# Patient Record
Sex: MALE | Race: WHITE
[De-identification: names, ages, dates, MRNs, and addresses within clinical notes are randomized per-mention and may not be internally consistent; named-entity substitution may affect disease eponyms.]

---

## 2017-12-20 NOTE — NUR
MS RN NOTES 



PATIENT IN BED RESTING NO SOB OR ACUTE DISTRESS NOTED. ARRIVED FROM ER. PATIENT ORIENTED TO 
ROOM AND FLOOR. CALL LIGHT WITHIN REACH. BED IN LOW LOCKED POSITION.  PATIENTS SKIN ASSESSED 
WOUND CARE PERFORMED. WILL CONTINUE TO MONITOR CLOSELY.

## 2017-12-20 NOTE — NUR
MS RN NOTES 



PATIENT IN BED RESTING NO SOB OR ACUTE DISTRESS NOTED. ALL DUE MEDICATIONS ADMINISTERED. 
PERIPHERAL IV INTACT PATENT. ALL NEEDS MET WILL ENDORSE TO PM SHIFT NIGEL.

## 2017-12-20 NOTE — NUR
KRISTY FROM [facility] FOR WOUND CHECK AND DEBRIDEMENT. PER PT PROCEDURE 
SCHEDULED TOMORROW. NAD NOTED. PT AAO X3, RR EVEN AND UNLABORED. VSS. PENDING 
MD KENT.

## 2017-12-20 NOTE — NUR
MS RN NOTE



RECEIVED PATIENT AWAKE AND ALERT IN BED. DENIES ANY PAIN OR DISCOMFORT AT THIS TIME. PICC 
LINE INTACT TO RUBY. DEBRIDEMENT IN AM TO ABHINAV AND KANDACE. BED LOCKED AND IN LOWEST POSITION. 
SIDE RAILS UP, CALL LIGHT WITHIN REACH. WILL CONTINUE TO MONITOR.

## 2017-12-21 NOTE — NUR
Patient currently resides at [facility] [phone]. He is alert and 
oriented, has good family support. Plan to dc back to [facility] when discharge.

-------------------------------------------------------------------------------

Addendum: 12/21/17 at 2113 by MOJGAN RANDLE RN

-------------------------------------------------------------------------------

Amended: Links added.

## 2017-12-21 NOTE — NUR
MS RN NOTES 



PATIENT REPORTS HAVING SOME EGGS FOR THE MORNING OR NOTIFIED ALSO DR. SUSAN OLGUIN NOTIFIED STATES 
SURGERY IS CANCELLED PATIENT WILL HAVE HIS FOOT DEBRIDEMENT AT BEDSIDE. NOTED AND CARRIED 
OUT.

## 2017-12-21 NOTE — NUR
MS RN NOTES 



PATIENT IN BED RESTING NO SOB OR ACUTE DISTRESS NOTED. PATIENT ALERT, ORIENTED X4 PICCLINE 
ON RIGHT UPPER ARM INTACT PATENT. BED IN LOW LOCKED POSITION CALL LIGHT WITHIN REACH. 
PATIENT NPO VERBALIZED FOR DEBRIDEMENT   UNDERSTANDING, WILL CONTINUE TO MONITOR.

## 2017-12-21 NOTE — NUR
WOUND CARE CONSULT   WOUND CARE RECEIVED CONSULT FOR PATIENT.  PATIENT WITH CURRENT XIOMARA 
AT 20.  SURGICAL TEAM FOLLOWING.  WOUND CARE WILL DEFER TREATMENT PLANS TO SURGICAL TEAM AT 
THIS TIME.

## 2017-12-21 NOTE — NUR
RN NOTES



BG 89 MG/DL, NO INSULIN COVERAGE, ONLY ACCUCHECK. PER DR. KENROY BOLES D5 1/2 NS AT 75 
CC/HR, EXPLAINED TO PATIENT RATIONALE FOR THE ORDER.

## 2017-12-21 NOTE — NUR
MS RN NOTES 



PATIENT IN BED RESTING NO SOB OR ACUTE DISTRESS NOTED. ALL DUE MEDICATIONS ADMINISTERED. ALL 
NEEDS MET. PATIENT HAD RIGHT FOOT DEBRIDEMENT AND TOLERATED WELL. PICC LINE INTACT PATENT. 
ENDORSED CARE TO PM SHIFT.

## 2017-12-21 NOTE — NUR
RN NOTES



PATIENT IN BED, ALERT AND ORIENTED X3, CALM, NO SOB, ON ROOM AIR, SPO2 99%, DENIES ANY PAIN 
AT THIS TIME, RIGHT UPPER ARM PICC LINE IS SECURED WITH DRESSING, RIGHT FOOT AND RIGHT TOE 
SECURED WITH DRESSING. PATIENT REFUSING IV FLUIDS, PER PATIENT MAKES HIS BLOOD SUGAR HIGHER. 
PATIENT IS ON D5 1/2 NS AND IS DIABETIC. KEPT SAFE AND COMFORTABLE, CALL LIGHT WITHIN REACH.

## 2017-12-22 NOTE — NUR
WOUND CARE CONSULT    WOUND CARE RECEIVED WOUND CARE CONSULT FOR PATIENT.  WOUND CARE WILL 
DEFER ALL TREATMENT PLANS TO SURGICAL TEAM WHO ARE FOLLOWING THIS PATIENT.  PATIENT WITH 
CURRENT XIOMARA AT 20.

## 2017-12-22 NOTE — NUR
M/S RN - Discharge

Patient alert and oriented throughout the shift, denies pain, not in any form of distress, 
stable for discharge to [facility]. Reviewed discharge instructions with ART Huston 
and he verbalized full understanding of all teachings including continuation of his Vanco 
and Merrem IV for his right foot and left finger wound. Patient to be discharged with RUBY 
PICC line, single lumen port flushing well, no signs of infiltration. Patient made aware 
that he also need to come back by 12/28/17 for scheduled surgery with Dr. Mi for left 
finger amputation. Patient refused photo to be taken on his skin breakdown. All belongings 
with the patient and he denies any missing items. CM arranged for ambulance  at 
18:45. Spoke with  and ETA will be 19:30 instead. Patient made aware of 
the delayed in pick-up time.

## 2017-12-22 NOTE — NUR
M/S RN - Discharge

Patient discharged to SNF [facility] in stable condition, endorsed to ambulance crew 
accordingly, discharge papers given.

## 2017-12-22 NOTE — NUR
RN NOTES



PATIENT IN BED, RESTING COMFORTABLY, NO SOB, NO DISTRESS, NO COMPLAIN OF PAIN, SLEPT 
INTERMITTENTLY FOR 5 HOURS, NO ADVERSE CHANGE OF CONDITION DURING SHIFT, ALL DUE MEDICATIONS 
GIVEN, CALL LIGHT WITHIN REACH.

## 2017-12-27 NOTE — NUR
PT CAME IN WITH C/O NON-HEALING LEFT MIDDLE FINGER CELLULITIS. SEEN BY MD FOR 
EVAL. VSS. PICC LINE SINGLE LUMEN ON RUBY. SAFETY AND COMFORT MEAUSRES PROVIDED. 
WILL MONITOR.

## 2017-12-27 NOTE — NUR
MS/RN  CLOSING NOTE



PATIENT IN BED AWAKE. ALERT AND ORIENTED X3. BREATHING REGULAR AND UNLABORED. IN NO APPARENT 
DISTRESS. DENIES SOB. DENIES PAIN. BED LOW AND LOCKED. SIDE RAIL X2 IN UPRIGHT POSITION. 
CALL LIGHT WITHIN REACH. WILL ENDORSED TO NIGHT SHIFT.

## 2017-12-27 NOTE — NUR
RN OPENING NOTES



RECEIVED REPORT FROM TIMOTHY RN. FOUND Pt AWAKE RESTING IN BED. NO S/S OF ACUTE DISTRESS OR 
SOB NOTED. NO C/O PAIN AT THIS TIME. IV ACCESS ON RUBY PICC LINE, IVF NS @75ML/HR, INFUSING 
WELL. Pt WILL BE NPO AT [facility] FOR PROCEDURE IN THE AM TOMORROW. SAFETY MEASURES IN PLACE. BED 
LOW, LOCKED, HOB ELEVATED, SIDE RAILS UP, CALL LIGHT AND BEDSIDE TABLE WITHIN REACH. WILL 
CONTINUE TO MONITOR Pt THROUGHOUT THE NIGHT FOR SAFETY.

## 2017-12-27 NOTE — NUR
MS/RN



RECEIVED THE PATIENT ON THE [facility]. ALERT AND ORIENTED X3. RESPIRATION REGULAR AND 
UNLABORED. DENIES SOB, PAIN AT THIS TIME. IN NO APPARENT DISTRESS. RIGHT UPPER ARM PICC LINE 
PATENT AND VANCO INFUSING WELL. NO ASE NOTED. BED LOW AND LOCKED. CALL LIGHT WITHIN REACH. 
REMINDED TO PRESS THE CALL LIGHT FOR ASSISTANCE. WILL CONTINUE TO MONITOR.

## 2017-12-28 NOTE — NUR
RN NOTES:





  PATIENT RETURNED FROM SURGERY. NO SIGNS OF DISTRESS. PATIENT AOX4. CAPILLARY REFILL LESS 
THAN 3 SECONDS ON LEFT HAND, DENIES PAIN, NO SWELLING, ABLE TO MOVE FINGERS, FINGERS WARM 
AND PINK. DRESSING INTACT. ORDERS TO RESUME PREVIOUS DIET AND ORDERS. AND TO KEEP DRESSING 
INTACT AND DRY. PATIENT REFUSED PLAVIX AND ASPIRIN FOR TODAY, THEY WERE HELD INITIALLY DUE 
TO THE STATUS OF PATIENT BEING NPO, BENEFITS AND RISKS EXPLAINED TO PATIENT



PATIENT EATING TOLERATING DIET WELL.



PATIENT ARRIVED ON UNIT AND VS ASSESSED EVERY Q 15 MINTUES:



125/54, SPO2 98, HR 58, TEMP 97.8



113/49 SPO2 96, TEMP 97.8, HR 59



110/52  SPO2 98, TEMP 97.6, HR 56



112/54 SPO2 95, TEMP 98.0, HR 58 WITH NONLABORED BREATHING NOTED 





CELLPHONE GLASSES WALLET RETURNED TO PATIENT AT BEDSIDE

## 2017-12-28 NOTE — NUR
RN NOTES:



  PATIENT AOX4. RESTING IN BED. NONLABORED BREATHING NOTED ON ROOM AIR. NO SIGNS OF DISTRESS 
NOTED. PATIENT DENIES PAIN AT THE MOMENT. PICC LINE PATENT AND INTACT. BED IN LOWEST LOCKED 
POSITION. CALL LIGHT WITHIN REACH. WILL CONTINUE TO MONITOR

## 2017-12-28 NOTE — NUR
RN NOTES:





  PATIENT AOX4. DISCHARGED TO [facility] PER DR MAKENNA RUIZ'S ORDERS. NONLABORED 
BREATHING ON ROOM AIR. VS WNL. ALL VALUABLES GIVEN TO PATIENT. PATIENT EDUCATED ON EXIST 
CARE VERBALIZES UNDERSTANDING. PATIENT AFEBRILE. PATIENT STATES THAT HE FEELS "LESS 
SENSATION IN THUMB AND INDEX FINGERS AFTER THE SURGERY AND THE MEDICATIONS ADMINISTERED IN 
SURGERY. CAPILLARY REFILL LESS THAN 3 SECONDS, HAND AND FINGERS WARM AND PINK. PATIENT ABLE 
TO MOVE FINGERS. NO SWELLING, RADIAL PULSE PALPABLE. HE STATES THAT IT IS GETTING BETTER. DR RUIZ INFORMED,



GAVE REPORT TO COLETTE NURSING SUPERVISOR AT [facility], DR DAVIS TO REMOVE HAND DRESSING 
IN A WEEK, INSTRUCTIONS EXPLAINED TO PATIENT AND STAFF, VERBALIZE UNDERSTANDING.  PICC LINE 
PATENT AND INTACT. PATIENT REFUSING LOWER EXTREMITY WOUND DRESSING, SKIN PICTURES, AND FLU 
VACCINE. BENEFITS AND RISKS EXPLAINED MULITPLE TIMES. PATIENT LEFT VIA AMBULANCE

## 2017-12-28 NOTE — NUR
RN CLOSING NOTES



NO SIGNIFICANT CHANGES IN Pt'S CONDITION. Pt REMAINS STABLE AT THIS TIME. NO S/S OF ACUTE 
DISTRESS OR SOB NOTED DURING THE NIGHT. ALL NEEDS MET AND ATTENDED TO. SAFETY MEASURES IN 
PLACE. WILL ENDORSE TO DAYSHIFT RN FOR Pt's NIGEL.

## 2018-02-22 ENCOUNTER — HOSPITAL ENCOUNTER (OUTPATIENT)
Dept: HOSPITAL 54 - WOU | Age: 68
Discharge: HOME HEALTH | End: 2018-02-22
Attending: SURGERY
Payer: MEDICARE

## 2018-02-22 DIAGNOSIS — L97.512: ICD-10-CM

## 2018-02-22 DIAGNOSIS — Z79.899: ICD-10-CM

## 2018-02-22 DIAGNOSIS — I25.10: ICD-10-CM

## 2018-02-22 DIAGNOSIS — L60.3: ICD-10-CM

## 2018-02-22 DIAGNOSIS — E78.5: ICD-10-CM

## 2018-02-22 DIAGNOSIS — T81.31XA: Primary | ICD-10-CM

## 2018-02-22 DIAGNOSIS — Z89.422: ICD-10-CM

## 2018-02-22 DIAGNOSIS — Z72.0: ICD-10-CM

## 2018-02-22 DIAGNOSIS — E11.621: ICD-10-CM

## 2018-02-22 DIAGNOSIS — Z98.61: ICD-10-CM

## 2018-02-22 DIAGNOSIS — I10: ICD-10-CM

## 2018-02-22 DIAGNOSIS — Z79.84: ICD-10-CM

## 2018-02-22 DIAGNOSIS — E11.40: ICD-10-CM

## 2018-02-22 PROCEDURE — A6402 STERILE GAUZE <= 16 SQ IN: HCPCS

## 2018-03-01 ENCOUNTER — HOSPITAL ENCOUNTER (OUTPATIENT)
Dept: HOSPITAL 54 - WOU | Age: 68
Discharge: HOME | End: 2018-03-01
Attending: SURGERY
Payer: MEDICARE

## 2018-03-01 DIAGNOSIS — E11.40: ICD-10-CM

## 2018-03-01 DIAGNOSIS — Z09: Primary | ICD-10-CM

## 2018-03-01 DIAGNOSIS — T81.31XD: ICD-10-CM

## 2018-03-01 DIAGNOSIS — E11.621: ICD-10-CM

## 2018-03-01 DIAGNOSIS — E78.5: ICD-10-CM

## 2018-03-01 DIAGNOSIS — L97.512: ICD-10-CM

## 2018-03-01 DIAGNOSIS — Z89.022: ICD-10-CM

## 2018-03-01 DIAGNOSIS — Z79.899: ICD-10-CM

## 2018-03-01 DIAGNOSIS — Z98.62: ICD-10-CM

## 2018-03-01 DIAGNOSIS — L84: ICD-10-CM

## 2018-03-01 DIAGNOSIS — I25.10: ICD-10-CM

## 2018-03-01 DIAGNOSIS — I48.91: ICD-10-CM

## 2018-03-01 DIAGNOSIS — Z72.0: ICD-10-CM

## 2018-03-01 DIAGNOSIS — Z79.84: ICD-10-CM

## 2018-03-01 PROCEDURE — A6402 STERILE GAUZE <= 16 SQ IN: HCPCS

## 2018-03-01 PROCEDURE — G0463 HOSPITAL OUTPT CLINIC VISIT: HCPCS

## 2018-03-01 PROCEDURE — 11042 DBRDMT SUBQ TIS 1ST 20SQCM/<: CPT

## 2018-03-08 ENCOUNTER — HOSPITAL ENCOUNTER (OUTPATIENT)
Dept: HOSPITAL 54 - WOU | Age: 68
Discharge: HOME HEALTH | End: 2018-03-08
Attending: SURGERY
Payer: MEDICARE

## 2018-03-08 DIAGNOSIS — Z72.0: ICD-10-CM

## 2018-03-08 DIAGNOSIS — I25.10: ICD-10-CM

## 2018-03-08 DIAGNOSIS — E11.621: ICD-10-CM

## 2018-03-08 DIAGNOSIS — T87.81: Primary | ICD-10-CM

## 2018-03-08 DIAGNOSIS — Z98.62: ICD-10-CM

## 2018-03-08 DIAGNOSIS — L97.512: ICD-10-CM

## 2018-03-08 DIAGNOSIS — E11.40: ICD-10-CM
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
## 2018-03-08 DIAGNOSIS — Z79.84: ICD-10-CM

## 2018-03-08 DIAGNOSIS — I48.91: ICD-10-CM

## 2018-03-08 DIAGNOSIS — E78.5: ICD-10-CM

## 2018-03-08 DIAGNOSIS — S51.812D: ICD-10-CM

## 2018-03-08 PROCEDURE — 11042 DBRDMT SUBQ TIS 1ST 20SQCM/<: CPT

## 2018-03-08 PROCEDURE — A6402 STERILE GAUZE <= 16 SQ IN: HCPCS

## 2018-03-08 PROCEDURE — G0463 HOSPITAL OUTPT CLINIC VISIT: HCPCS

## 2018-03-29 ENCOUNTER — HOSPITAL ENCOUNTER (OUTPATIENT)
Dept: HOSPITAL 54 - WOU | Age: 68
Discharge: HOME HEALTH | End: 2018-03-29
Attending: PODIATRIST
Payer: MEDICARE

## 2018-03-29 DIAGNOSIS — E78.5: ICD-10-CM

## 2018-03-29 DIAGNOSIS — Z89.022: ICD-10-CM

## 2018-03-29 DIAGNOSIS — I25.10: ICD-10-CM

## 2018-03-29 DIAGNOSIS — Z72.0: ICD-10-CM

## 2018-03-29 DIAGNOSIS — I48.91: ICD-10-CM

## 2018-03-29 DIAGNOSIS — L97.512: ICD-10-CM

## 2018-03-29 DIAGNOSIS — Z79.84: ICD-10-CM

## 2018-03-29 DIAGNOSIS — Z09: ICD-10-CM

## 2018-03-29 DIAGNOSIS — E11.40: ICD-10-CM

## 2018-03-29 DIAGNOSIS — Z79.899: ICD-10-CM

## 2018-03-29 DIAGNOSIS — E11.621: Primary | ICD-10-CM

## 2018-03-29 PROCEDURE — A6402 STERILE GAUZE <= 16 SQ IN: HCPCS

## 2018-03-29 PROCEDURE — G0463 HOSPITAL OUTPT CLINIC VISIT: HCPCS

## 2018-04-19 ENCOUNTER — HOSPITAL ENCOUNTER (OUTPATIENT)
Dept: HOSPITAL 54 - WOU | Age: 68
Discharge: HOME | End: 2018-04-19
Attending: SURGERY
Payer: MEDICARE

## 2018-04-19 DIAGNOSIS — Z91.19: ICD-10-CM

## 2018-04-19 DIAGNOSIS — I10: ICD-10-CM

## 2018-04-19 DIAGNOSIS — E78.5: ICD-10-CM

## 2018-04-19 DIAGNOSIS — E11.40: ICD-10-CM

## 2018-04-19 DIAGNOSIS — T81.31XD: ICD-10-CM

## 2018-04-19 DIAGNOSIS — Z89.412: ICD-10-CM

## 2018-04-19 DIAGNOSIS — L97.512: ICD-10-CM

## 2018-04-19 DIAGNOSIS — I25.10: ICD-10-CM

## 2018-04-19 DIAGNOSIS — E11.621: Primary | ICD-10-CM

## 2018-04-19 DIAGNOSIS — Z89.022: ICD-10-CM

## 2018-04-19 PROCEDURE — G0463 HOSPITAL OUTPT CLINIC VISIT: HCPCS

## 2018-04-19 PROCEDURE — A6402 STERILE GAUZE <= 16 SQ IN: HCPCS

## 2018-04-26 ENCOUNTER — HOSPITAL ENCOUNTER (OUTPATIENT)
Dept: HOSPITAL 54 - WOU | Age: 68
Discharge: HOME | End: 2018-04-26
Attending: PODIATRIST
Payer: MEDICARE
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
## 2018-04-26 DIAGNOSIS — Z79.84: ICD-10-CM

## 2018-04-26 DIAGNOSIS — S81.811A: ICD-10-CM

## 2018-04-26 DIAGNOSIS — Z89.022: ICD-10-CM

## 2018-04-26 DIAGNOSIS — S91.311A: ICD-10-CM

## 2018-04-26 DIAGNOSIS — L97.512: ICD-10-CM

## 2018-04-26 DIAGNOSIS — E11.40: ICD-10-CM

## 2018-04-26 DIAGNOSIS — F17.200: ICD-10-CM

## 2018-04-26 DIAGNOSIS — Z89.412: ICD-10-CM

## 2018-04-26 DIAGNOSIS — I10: ICD-10-CM
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
## 2018-04-26 DIAGNOSIS — E11.621: Primary | ICD-10-CM

## 2018-04-26 PROCEDURE — A6402 STERILE GAUZE <= 16 SQ IN: HCPCS

## 2018-05-03 ENCOUNTER — HOSPITAL ENCOUNTER (OUTPATIENT)
Dept: HOSPITAL 54 - WOU | Age: 68
Discharge: HOME | End: 2018-05-03
Attending: PODIATRIST
Payer: MEDICARE

## 2018-05-03 DIAGNOSIS — E11.621: Primary | ICD-10-CM

## 2018-05-03 DIAGNOSIS — Z89.412: ICD-10-CM

## 2018-05-03 DIAGNOSIS — I10: ICD-10-CM

## 2018-05-03 DIAGNOSIS — F17.200: ICD-10-CM

## 2018-05-03 DIAGNOSIS — E11.40: ICD-10-CM

## 2018-05-03 DIAGNOSIS — E78.5: ICD-10-CM

## 2018-05-03 DIAGNOSIS — Z79.84: ICD-10-CM

## 2018-05-03 DIAGNOSIS — Z89.022: ICD-10-CM

## 2018-05-03 DIAGNOSIS — I25.10: ICD-10-CM

## 2018-05-03 DIAGNOSIS — L97.512: ICD-10-CM

## 2018-05-03 PROCEDURE — A6402 STERILE GAUZE <= 16 SQ IN: HCPCS

## 2018-05-17 ENCOUNTER — HOSPITAL ENCOUNTER (OUTPATIENT)
Dept: HOSPITAL 54 - WOU | Age: 68
Discharge: HOME | End: 2018-05-17
Attending: PODIATRIST
Payer: MEDICARE

## 2018-05-17 DIAGNOSIS — L97.512: ICD-10-CM

## 2018-05-17 DIAGNOSIS — E11.40: ICD-10-CM

## 2018-05-17 DIAGNOSIS — Z89.412: ICD-10-CM

## 2018-05-17 DIAGNOSIS — Z98.62: ICD-10-CM

## 2018-05-17 DIAGNOSIS — I25.10: ICD-10-CM

## 2018-05-17 DIAGNOSIS — L84: ICD-10-CM

## 2018-05-17 DIAGNOSIS — Z72.0: ICD-10-CM

## 2018-05-17 DIAGNOSIS — E11.621: Primary | ICD-10-CM

## 2018-05-17 DIAGNOSIS — Z89.022: ICD-10-CM

## 2018-05-17 DIAGNOSIS — Z79.84: ICD-10-CM

## 2018-05-17 PROCEDURE — A6402 STERILE GAUZE <= 16 SQ IN: HCPCS

## 2018-05-24 ENCOUNTER — HOSPITAL ENCOUNTER (OUTPATIENT)
Dept: HOSPITAL 54 - WOU | Age: 68
Discharge: HOME | End: 2018-05-24
Attending: PODIATRIST
Payer: MEDICARE

## 2018-05-24 DIAGNOSIS — Z89.022: ICD-10-CM

## 2018-05-24 DIAGNOSIS — E11.40: ICD-10-CM

## 2018-05-24 DIAGNOSIS — Z79.899: ICD-10-CM

## 2018-05-24 DIAGNOSIS — Z72.0: ICD-10-CM

## 2018-05-24 DIAGNOSIS — Z79.84: ICD-10-CM

## 2018-05-24 DIAGNOSIS — E11.621: Primary | ICD-10-CM

## 2018-05-24 DIAGNOSIS — L97.512: ICD-10-CM

## 2018-05-24 PROCEDURE — 11042 DBRDMT SUBQ TIS 1ST 20SQCM/<: CPT

## 2018-05-24 PROCEDURE — A6402 STERILE GAUZE <= 16 SQ IN: HCPCS

## 2018-05-24 PROCEDURE — A6207 CONTACT LAYER >16<= 48 SQ IN: HCPCS

## 2018-05-31 ENCOUNTER — HOSPITAL ENCOUNTER (OUTPATIENT)
Dept: HOSPITAL 54 - WOU | Age: 68
Discharge: HOME | End: 2018-05-31
Attending: PODIATRIST
Payer: MEDICARE

## 2018-05-31 DIAGNOSIS — Z72.0: ICD-10-CM

## 2018-05-31 DIAGNOSIS — E11.40: ICD-10-CM

## 2018-05-31 DIAGNOSIS — Z91.19: ICD-10-CM

## 2018-05-31 DIAGNOSIS — Z79.84: ICD-10-CM

## 2018-05-31 DIAGNOSIS — I25.10: ICD-10-CM

## 2018-05-31 DIAGNOSIS — L97.512: ICD-10-CM

## 2018-05-31 DIAGNOSIS — E11.621: Primary | ICD-10-CM

## 2018-05-31 DIAGNOSIS — Z98.61: ICD-10-CM

## 2018-05-31 DIAGNOSIS — E78.5: ICD-10-CM

## 2018-05-31 PROCEDURE — 11042 DBRDMT SUBQ TIS 1ST 20SQCM/<: CPT

## 2018-05-31 PROCEDURE — A6402 STERILE GAUZE <= 16 SQ IN: HCPCS

## 2018-06-04 ENCOUNTER — HOSPITAL ENCOUNTER (OUTPATIENT)
Dept: HOSPITAL 54 - WOU | Age: 68
Discharge: HOME HEALTH | End: 2018-06-04
Attending: PODIATRIST
Payer: MEDICARE

## 2018-06-04 DIAGNOSIS — Z98.62: ICD-10-CM

## 2018-06-04 DIAGNOSIS — E78.5: ICD-10-CM

## 2018-06-04 DIAGNOSIS — Z89.022: ICD-10-CM

## 2018-06-04 DIAGNOSIS — I25.10: ICD-10-CM

## 2018-06-04 DIAGNOSIS — E11.40: ICD-10-CM

## 2018-06-04 DIAGNOSIS — L97.512: ICD-10-CM

## 2018-06-04 DIAGNOSIS — Z79.84: ICD-10-CM

## 2018-06-04 DIAGNOSIS — Z79.899: ICD-10-CM

## 2018-06-04 DIAGNOSIS — I10: ICD-10-CM

## 2018-06-04 DIAGNOSIS — E11.621: Primary | ICD-10-CM

## 2018-06-04 PROCEDURE — A6402 STERILE GAUZE <= 16 SQ IN: HCPCS

## 2018-06-04 PROCEDURE — 11042 DBRDMT SUBQ TIS 1ST 20SQCM/<: CPT

## 2018-06-14 ENCOUNTER — HOSPITAL ENCOUNTER (OUTPATIENT)
Dept: HOSPITAL 54 - WOU | Age: 68
Discharge: HOME HEALTH | End: 2018-06-14
Attending: PODIATRIST
Payer: MEDICARE

## 2018-06-14 DIAGNOSIS — I10: ICD-10-CM

## 2018-06-14 DIAGNOSIS — E11.40: ICD-10-CM

## 2018-06-14 DIAGNOSIS — E78.5: ICD-10-CM

## 2018-06-14 DIAGNOSIS — Z98.61: ICD-10-CM

## 2018-06-14 DIAGNOSIS — Z72.0: ICD-10-CM

## 2018-06-14 DIAGNOSIS — Z79.84: ICD-10-CM

## 2018-06-14 DIAGNOSIS — E11.621: Primary | ICD-10-CM

## 2018-06-14 DIAGNOSIS — L97.512: ICD-10-CM

## 2018-06-14 DIAGNOSIS — I25.10: ICD-10-CM

## 2018-06-14 PROCEDURE — A6402 STERILE GAUZE <= 16 SQ IN: HCPCS

## 2018-06-28 ENCOUNTER — HOSPITAL ENCOUNTER (OUTPATIENT)
Dept: HOSPITAL 54 - WOU | Age: 68
Discharge: HOME HEALTH | End: 2018-06-28
Attending: PODIATRIST
Payer: MEDICARE

## 2018-06-28 DIAGNOSIS — I10: ICD-10-CM

## 2018-06-28 DIAGNOSIS — Z79.84: ICD-10-CM

## 2018-06-28 DIAGNOSIS — L97.512: ICD-10-CM

## 2018-06-28 DIAGNOSIS — Z98.62: ICD-10-CM

## 2018-06-28 DIAGNOSIS — Z72.0: ICD-10-CM

## 2018-06-28 DIAGNOSIS — I25.10: ICD-10-CM

## 2018-06-28 DIAGNOSIS — E11.621: Primary | ICD-10-CM

## 2018-06-28 DIAGNOSIS — Z89.022: ICD-10-CM

## 2018-06-28 DIAGNOSIS — E11.40: ICD-10-CM

## 2018-06-28 DIAGNOSIS — T81.31XD: ICD-10-CM

## 2018-06-28 DIAGNOSIS — E78.5: ICD-10-CM

## 2018-06-28 PROCEDURE — Z7610: HCPCS

## 2018-06-28 PROCEDURE — A6402 STERILE GAUZE <= 16 SQ IN: HCPCS

## 2018-07-05 ENCOUNTER — HOSPITAL ENCOUNTER (OUTPATIENT)
Dept: HOSPITAL 54 - WOU | Age: 68
Discharge: HOME HEALTH | End: 2018-07-05
Attending: PODIATRIST
Payer: MEDICARE

## 2018-07-05 DIAGNOSIS — Z72.0: ICD-10-CM

## 2018-07-05 DIAGNOSIS — I25.10: ICD-10-CM

## 2018-07-05 DIAGNOSIS — L97.512: ICD-10-CM

## 2018-07-05 DIAGNOSIS — E78.5: ICD-10-CM

## 2018-07-05 DIAGNOSIS — E11.621: Primary | ICD-10-CM

## 2018-07-05 DIAGNOSIS — I10: ICD-10-CM

## 2018-07-05 DIAGNOSIS — Z79.84: ICD-10-CM

## 2018-07-05 DIAGNOSIS — Z98.61: ICD-10-CM

## 2018-07-05 DIAGNOSIS — Z79.899: ICD-10-CM

## 2018-07-05 PROCEDURE — Z7610: HCPCS

## 2018-07-05 PROCEDURE — A6402 STERILE GAUZE <= 16 SQ IN: HCPCS

## 2018-08-16 ENCOUNTER — HOSPITAL ENCOUNTER (OUTPATIENT)
Dept: HOSPITAL 54 - WOU | Age: 68
Discharge: HOME HEALTH | End: 2018-08-16
Attending: PODIATRIST
Payer: MEDICARE

## 2018-08-16 DIAGNOSIS — L97.519: ICD-10-CM

## 2018-08-16 DIAGNOSIS — Z98.62: ICD-10-CM

## 2018-08-16 DIAGNOSIS — E11.40: ICD-10-CM

## 2018-08-16 DIAGNOSIS — L97.512: ICD-10-CM

## 2018-08-16 DIAGNOSIS — Z72.0: ICD-10-CM

## 2018-08-16 DIAGNOSIS — Z79.84: ICD-10-CM

## 2018-08-16 DIAGNOSIS — I10: ICD-10-CM

## 2018-08-16 DIAGNOSIS — E78.5: ICD-10-CM

## 2018-08-16 DIAGNOSIS — E11.621: Primary | ICD-10-CM

## 2018-08-16 DIAGNOSIS — L03.115: ICD-10-CM

## 2018-08-16 DIAGNOSIS — E11.65: ICD-10-CM

## 2018-08-16 DIAGNOSIS — Z79.899: ICD-10-CM

## 2018-08-16 PROCEDURE — 11042 DBRDMT SUBQ TIS 1ST 20SQCM/<: CPT

## 2018-08-16 PROCEDURE — Z7610: HCPCS

## 2018-08-16 PROCEDURE — A6402 STERILE GAUZE <= 16 SQ IN: HCPCS

## 2018-08-23 ENCOUNTER — HOSPITAL ENCOUNTER (OUTPATIENT)
Dept: HOSPITAL 54 - WOU | Age: 68
Discharge: HOME HEALTH | End: 2018-08-23
Attending: PODIATRIST
Payer: MEDICARE

## 2018-08-23 DIAGNOSIS — E11.40: ICD-10-CM

## 2018-08-23 DIAGNOSIS — Z72.0: ICD-10-CM

## 2018-08-23 DIAGNOSIS — L97.518: ICD-10-CM

## 2018-08-23 DIAGNOSIS — Z79.899: ICD-10-CM

## 2018-08-23 DIAGNOSIS — E11.621: Primary | ICD-10-CM

## 2018-08-23 DIAGNOSIS — I10: ICD-10-CM

## 2018-08-23 DIAGNOSIS — Z98.61: ICD-10-CM

## 2018-08-23 DIAGNOSIS — I48.91: ICD-10-CM

## 2018-08-23 DIAGNOSIS — I25.10: ICD-10-CM

## 2018-08-23 DIAGNOSIS — Z79.84: ICD-10-CM

## 2018-08-23 DIAGNOSIS — E78.5: ICD-10-CM

## 2018-08-23 DIAGNOSIS — Z89.412: ICD-10-CM

## 2018-08-23 DIAGNOSIS — L97.512: ICD-10-CM

## 2018-08-23 DIAGNOSIS — L60.3: ICD-10-CM

## 2018-08-23 PROCEDURE — Z7610: HCPCS

## 2018-08-23 PROCEDURE — A6402 STERILE GAUZE <= 16 SQ IN: HCPCS

## 2018-09-13 ENCOUNTER — HOSPITAL ENCOUNTER (OUTPATIENT)
Dept: HOSPITAL 54 - WOU | Age: 68
Discharge: HOME HEALTH | End: 2018-09-13
Attending: PODIATRIST
Payer: MEDICARE

## 2018-09-13 DIAGNOSIS — E78.5: ICD-10-CM

## 2018-09-13 DIAGNOSIS — L97.512: ICD-10-CM

## 2018-09-13 DIAGNOSIS — Z72.0: ICD-10-CM

## 2018-09-13 DIAGNOSIS — Z79.899: ICD-10-CM

## 2018-09-13 DIAGNOSIS — L97.518: ICD-10-CM

## 2018-09-13 DIAGNOSIS — E11.621: Primary | ICD-10-CM

## 2018-09-13 DIAGNOSIS — I10: ICD-10-CM

## 2018-09-13 DIAGNOSIS — E11.40: ICD-10-CM

## 2018-09-13 DIAGNOSIS — Z91.19: ICD-10-CM

## 2018-09-13 DIAGNOSIS — Z79.84: ICD-10-CM

## 2018-09-13 DIAGNOSIS — I25.10: ICD-10-CM

## 2018-09-13 DIAGNOSIS — Z98.61: ICD-10-CM

## 2018-09-13 DIAGNOSIS — L60.3: ICD-10-CM

## 2018-09-13 PROCEDURE — Z7610: HCPCS

## 2018-09-13 PROCEDURE — 11042 DBRDMT SUBQ TIS 1ST 20SQCM/<: CPT

## 2018-09-13 PROCEDURE — A6402 STERILE GAUZE <= 16 SQ IN: HCPCS

## 2018-09-20 ENCOUNTER — HOSPITAL ENCOUNTER (OUTPATIENT)
Dept: HOSPITAL 54 - WOU | Age: 68
Discharge: HOME HEALTH | End: 2018-09-20
Attending: PODIATRIST
Payer: MEDICARE

## 2018-09-20 DIAGNOSIS — I10: ICD-10-CM

## 2018-09-20 DIAGNOSIS — F17.200: ICD-10-CM

## 2018-09-20 DIAGNOSIS — Z79.899: ICD-10-CM

## 2018-09-20 DIAGNOSIS — L97.412: ICD-10-CM

## 2018-09-20 DIAGNOSIS — E78.5: ICD-10-CM

## 2018-09-20 DIAGNOSIS — Z98.61: ICD-10-CM

## 2018-09-20 DIAGNOSIS — L60.3: ICD-10-CM

## 2018-09-20 DIAGNOSIS — E11.621: Primary | ICD-10-CM

## 2018-09-20 DIAGNOSIS — L97.512: ICD-10-CM

## 2018-09-20 DIAGNOSIS — I25.10: ICD-10-CM

## 2018-09-20 DIAGNOSIS — E11.40: ICD-10-CM

## 2018-09-20 DIAGNOSIS — Z79.84: ICD-10-CM

## 2018-09-20 PROCEDURE — A6402 STERILE GAUZE <= 16 SQ IN: HCPCS

## 2018-09-20 PROCEDURE — Z7610: HCPCS

## 2018-09-20 PROCEDURE — 11042 DBRDMT SUBQ TIS 1ST 20SQCM/<: CPT

## 2018-10-18 ENCOUNTER — HOSPITAL ENCOUNTER (OUTPATIENT)
Dept: HOSPITAL 54 - WOU | Age: 68
Discharge: HOME HEALTH | End: 2018-10-18
Attending: SURGERY
Payer: MEDICARE

## 2018-10-18 DIAGNOSIS — Z98.61: ICD-10-CM

## 2018-10-18 DIAGNOSIS — E78.5: ICD-10-CM

## 2018-10-18 DIAGNOSIS — L60.3: ICD-10-CM

## 2018-10-18 DIAGNOSIS — Z79.84: ICD-10-CM

## 2018-10-18 DIAGNOSIS — F17.200: ICD-10-CM

## 2018-10-18 DIAGNOSIS — L97.512: ICD-10-CM

## 2018-10-18 DIAGNOSIS — E11.621: Primary | ICD-10-CM

## 2018-10-18 DIAGNOSIS — I25.10: ICD-10-CM

## 2018-10-18 DIAGNOSIS — Z79.899: ICD-10-CM

## 2018-10-18 PROCEDURE — A6402 STERILE GAUZE <= 16 SQ IN: HCPCS

## 2018-10-18 PROCEDURE — Z7610: HCPCS

## 2018-12-17 ENCOUNTER — HOSPITAL ENCOUNTER (OUTPATIENT)
Dept: HOSPITAL 54 - WOU | Age: 68
Discharge: HOME HEALTH | End: 2018-12-17
Attending: PODIATRIST
Payer: MEDICARE

## 2018-12-17 DIAGNOSIS — L60.3: ICD-10-CM

## 2018-12-17 DIAGNOSIS — E11.40: Primary | ICD-10-CM

## 2018-12-17 DIAGNOSIS — Z79.84: ICD-10-CM

## 2018-12-17 PROCEDURE — 29445 APPL RIGID TOT CNTC LEG CAST: CPT

## 2018-12-17 PROCEDURE — Z7610: HCPCS

## 2018-12-17 PROCEDURE — A6402 STERILE GAUZE <= 16 SQ IN: HCPCS

## 2018-12-24 ENCOUNTER — HOSPITAL ENCOUNTER (OUTPATIENT)
Dept: HOSPITAL 54 - WOU | Age: 68
Discharge: HOME HEALTH | End: 2018-12-24
Attending: PODIATRIST
Payer: MEDICARE

## 2018-12-24 DIAGNOSIS — I48.91: ICD-10-CM

## 2018-12-24 DIAGNOSIS — Z89.022: ICD-10-CM

## 2018-12-24 DIAGNOSIS — I10: ICD-10-CM

## 2018-12-24 DIAGNOSIS — F17.200: ICD-10-CM

## 2018-12-24 DIAGNOSIS — I25.10: ICD-10-CM

## 2018-12-24 DIAGNOSIS — Z79.84: ICD-10-CM

## 2018-12-24 DIAGNOSIS — L60.3: ICD-10-CM

## 2018-12-24 DIAGNOSIS — E11.40: Primary | ICD-10-CM

## 2018-12-24 PROCEDURE — Z7610: HCPCS

## 2018-12-24 PROCEDURE — G0463 HOSPITAL OUTPT CLINIC VISIT: HCPCS

## 2019-01-10 ENCOUNTER — HOSPITAL ENCOUNTER (OUTPATIENT)
Dept: HOSPITAL 54 - WOU | Age: 69
Discharge: HOME HEALTH | End: 2019-01-10
Attending: PODIATRIST
Payer: MEDICARE

## 2019-01-10 DIAGNOSIS — I25.10: ICD-10-CM

## 2019-01-10 DIAGNOSIS — F17.200: ICD-10-CM

## 2019-01-10 DIAGNOSIS — E11.621: Primary | ICD-10-CM

## 2019-01-10 DIAGNOSIS — L97.512: ICD-10-CM

## 2019-01-10 DIAGNOSIS — Z79.84: ICD-10-CM

## 2019-01-10 DIAGNOSIS — Z79.899: ICD-10-CM

## 2019-01-10 DIAGNOSIS — Z89.412: ICD-10-CM

## 2019-01-10 DIAGNOSIS — L60.3: ICD-10-CM

## 2019-01-10 DIAGNOSIS — E11.40: ICD-10-CM

## 2019-01-10 DIAGNOSIS — Z89.022: ICD-10-CM

## 2019-01-10 PROCEDURE — A6402 STERILE GAUZE <= 16 SQ IN: HCPCS

## 2019-01-10 PROCEDURE — 11042 DBRDMT SUBQ TIS 1ST 20SQCM/<: CPT

## 2019-01-14 ENCOUNTER — HOSPITAL ENCOUNTER (OUTPATIENT)
Dept: HOSPITAL 54 - WOU | Age: 69
Discharge: HOME HEALTH | End: 2019-01-14
Attending: PODIATRIST
Payer: MEDICARE

## 2019-01-14 DIAGNOSIS — E11.40: ICD-10-CM

## 2019-01-14 DIAGNOSIS — L97.518: ICD-10-CM

## 2019-01-14 DIAGNOSIS — Z79.84: ICD-10-CM

## 2019-01-14 DIAGNOSIS — L60.3: ICD-10-CM

## 2019-01-14 DIAGNOSIS — E11.621: Primary | ICD-10-CM

## 2019-01-14 PROCEDURE — A6402 STERILE GAUZE <= 16 SQ IN: HCPCS

## 2019-01-14 PROCEDURE — 29445 APPL RIGID TOT CNTC LEG CAST: CPT

## 2019-01-24 ENCOUNTER — HOSPITAL ENCOUNTER (OUTPATIENT)
Dept: HOSPITAL 54 - WOU | Age: 69
Discharge: HOME HEALTH | End: 2019-01-24
Attending: PODIATRIST
Payer: MEDICARE

## 2019-01-24 DIAGNOSIS — Z89.022: ICD-10-CM

## 2019-01-24 DIAGNOSIS — Z89.412: ICD-10-CM

## 2019-01-24 DIAGNOSIS — L60.3: ICD-10-CM

## 2019-01-24 DIAGNOSIS — I25.10: ICD-10-CM

## 2019-01-24 DIAGNOSIS — I48.91: ICD-10-CM

## 2019-01-24 DIAGNOSIS — I10: ICD-10-CM

## 2019-01-24 DIAGNOSIS — L84: ICD-10-CM

## 2019-01-24 DIAGNOSIS — Z79.899: ICD-10-CM

## 2019-01-24 DIAGNOSIS — E11.40: Primary | ICD-10-CM

## 2019-01-24 DIAGNOSIS — E78.5: ICD-10-CM

## 2019-01-24 DIAGNOSIS — Z86.31: ICD-10-CM

## 2019-01-24 DIAGNOSIS — Z79.84: ICD-10-CM

## 2019-01-24 PROCEDURE — 29445 APPL RIGID TOT CNTC LEG CAST: CPT

## 2019-01-24 PROCEDURE — A6402 STERILE GAUZE <= 16 SQ IN: HCPCS

## 2019-02-04 ENCOUNTER — HOSPITAL ENCOUNTER (OUTPATIENT)
Dept: HOSPITAL 54 - WOU | Age: 69
Discharge: HOME HEALTH | End: 2019-02-04
Attending: PODIATRIST
Payer: MEDICARE

## 2019-02-04 DIAGNOSIS — Z79.84: ICD-10-CM

## 2019-02-04 DIAGNOSIS — E11.40: Primary | ICD-10-CM

## 2019-02-04 DIAGNOSIS — L60.3: ICD-10-CM

## 2019-02-04 DIAGNOSIS — F17.200: ICD-10-CM

## 2019-02-04 DIAGNOSIS — I10: ICD-10-CM

## 2019-02-04 DIAGNOSIS — Z86.31: ICD-10-CM

## 2019-02-04 PROCEDURE — G0463 HOSPITAL OUTPT CLINIC VISIT: HCPCS

## 2019-03-07 ENCOUNTER — HOSPITAL ENCOUNTER (OUTPATIENT)
Dept: HOSPITAL 54 - WOU | Age: 69
Discharge: HOME HEALTH | End: 2019-03-07
Attending: PODIATRIST
Payer: MEDICARE

## 2019-03-07 DIAGNOSIS — Z89.022: ICD-10-CM

## 2019-03-07 DIAGNOSIS — Z79.84: ICD-10-CM

## 2019-03-07 DIAGNOSIS — E11.621: Primary | ICD-10-CM

## 2019-03-07 DIAGNOSIS — I25.10: ICD-10-CM

## 2019-03-07 DIAGNOSIS — E11.40: ICD-10-CM

## 2019-03-07 DIAGNOSIS — I10: ICD-10-CM

## 2019-03-07 DIAGNOSIS — F17.200: ICD-10-CM

## 2019-03-07 DIAGNOSIS — I48.91: ICD-10-CM

## 2019-03-07 DIAGNOSIS — E78.5: ICD-10-CM

## 2019-03-07 DIAGNOSIS — Z89.412: ICD-10-CM

## 2019-03-07 DIAGNOSIS — L60.3: ICD-10-CM

## 2019-03-07 DIAGNOSIS — Z79.899: ICD-10-CM

## 2019-03-07 DIAGNOSIS — L97.512: ICD-10-CM

## 2019-03-07 DIAGNOSIS — Z98.62: ICD-10-CM

## 2019-03-07 PROCEDURE — A6209 FOAM DRSG <=16 SQ IN W/O BDR: HCPCS

## 2019-03-07 PROCEDURE — A6402 STERILE GAUZE <= 16 SQ IN: HCPCS

## 2019-03-07 PROCEDURE — 11042 DBRDMT SUBQ TIS 1ST 20SQCM/<: CPT

## 2019-03-14 ENCOUNTER — HOSPITAL ENCOUNTER (OUTPATIENT)
Dept: HOSPITAL 54 - WOU | Age: 69
Discharge: HOME HEALTH | End: 2019-03-14
Attending: PODIATRIST
Payer: MEDICARE

## 2019-03-14 DIAGNOSIS — Z79.899: ICD-10-CM

## 2019-03-14 DIAGNOSIS — I48.91: ICD-10-CM

## 2019-03-14 DIAGNOSIS — L60.3: ICD-10-CM

## 2019-03-14 DIAGNOSIS — E11.40: ICD-10-CM

## 2019-03-14 DIAGNOSIS — E78.5: ICD-10-CM

## 2019-03-14 DIAGNOSIS — E11.621: Primary | ICD-10-CM

## 2019-03-14 DIAGNOSIS — Z79.84: ICD-10-CM

## 2019-03-14 DIAGNOSIS — I10: ICD-10-CM

## 2019-03-14 DIAGNOSIS — L97.512: ICD-10-CM

## 2019-03-14 PROCEDURE — A6402 STERILE GAUZE <= 16 SQ IN: HCPCS

## 2019-03-14 PROCEDURE — 11042 DBRDMT SUBQ TIS 1ST 20SQCM/<: CPT

## 2019-03-14 PROCEDURE — A6209 FOAM DRSG <=16 SQ IN W/O BDR: HCPCS

## 2019-03-25 ENCOUNTER — HOSPITAL ENCOUNTER (INPATIENT)
Dept: HOSPITAL 54 - WOUND3 | Age: 69
LOS: 3 days | Discharge: NURSING, REHAB OR LONG-TERM CARE | DRG: 299 | End: 2019-03-28
Attending: INTERNAL MEDICINE | Admitting: NURSE PRACTITIONER
Payer: MEDICARE

## 2019-03-25 ENCOUNTER — HOSPITAL ENCOUNTER (OUTPATIENT)
Dept: HOSPITAL 54 - WOU | Age: 69
Discharge: HOME HEALTH | End: 2019-03-25
Attending: PODIATRIST
Payer: MEDICARE

## 2019-03-25 VITALS — WEIGHT: 266 LBS | HEIGHT: 71 IN | BODY MASS INDEX: 37.24 KG/M2

## 2019-03-25 VITALS — SYSTOLIC BLOOD PRESSURE: 136 MMHG | DIASTOLIC BLOOD PRESSURE: 68 MMHG

## 2019-03-25 VITALS — SYSTOLIC BLOOD PRESSURE: 134 MMHG | DIASTOLIC BLOOD PRESSURE: 62 MMHG

## 2019-03-25 VITALS — SYSTOLIC BLOOD PRESSURE: 168 MMHG | DIASTOLIC BLOOD PRESSURE: 74 MMHG

## 2019-03-25 DIAGNOSIS — F17.200: ICD-10-CM

## 2019-03-25 DIAGNOSIS — E11.42: ICD-10-CM

## 2019-03-25 DIAGNOSIS — Z89.412: ICD-10-CM

## 2019-03-25 DIAGNOSIS — E11.621: ICD-10-CM

## 2019-03-25 DIAGNOSIS — I25.10: ICD-10-CM

## 2019-03-25 DIAGNOSIS — E11.65: ICD-10-CM

## 2019-03-25 DIAGNOSIS — Z79.84: ICD-10-CM

## 2019-03-25 DIAGNOSIS — L60.3: ICD-10-CM

## 2019-03-25 DIAGNOSIS — E11.22: ICD-10-CM

## 2019-03-25 DIAGNOSIS — I48.91: ICD-10-CM

## 2019-03-25 DIAGNOSIS — Z89.022: ICD-10-CM

## 2019-03-25 DIAGNOSIS — E11.40: ICD-10-CM

## 2019-03-25 DIAGNOSIS — N18.9: ICD-10-CM

## 2019-03-25 DIAGNOSIS — E11.51: Primary | ICD-10-CM

## 2019-03-25 DIAGNOSIS — L97.515: ICD-10-CM

## 2019-03-25 DIAGNOSIS — D68.59: ICD-10-CM

## 2019-03-25 DIAGNOSIS — I12.9: ICD-10-CM

## 2019-03-25 DIAGNOSIS — Z95.5: ICD-10-CM

## 2019-03-25 DIAGNOSIS — L03.115: ICD-10-CM

## 2019-03-25 DIAGNOSIS — N17.0: ICD-10-CM

## 2019-03-25 DIAGNOSIS — L98.499: ICD-10-CM

## 2019-03-25 DIAGNOSIS — E66.9: ICD-10-CM

## 2019-03-25 DIAGNOSIS — L97.519: ICD-10-CM

## 2019-03-25 DIAGNOSIS — E11.621: Primary | ICD-10-CM

## 2019-03-25 DIAGNOSIS — I10: ICD-10-CM

## 2019-03-25 DIAGNOSIS — E78.5: ICD-10-CM

## 2019-03-25 DIAGNOSIS — Z95.820: ICD-10-CM

## 2019-03-25 LAB
ALBUMIN SERPL BCP-MCNC: 3.6 G/DL (ref 3.4–5)
ALP SERPL-CCNC: 128 U/L (ref 46–116)
ALT SERPL W P-5'-P-CCNC: 48 U/L (ref 12–78)
AST SERPL W P-5'-P-CCNC: 27 U/L (ref 15–37)
BASOPHILS # BLD AUTO: 0.1 /CMM (ref 0–0.2)
BASOPHILS NFR BLD AUTO: 0.8 % (ref 0–2)
BILIRUB DIRECT SERPL-MCNC: 0.1 MG/DL (ref 0–0.2)
BILIRUB SERPL-MCNC: 0.6 MG/DL (ref 0.2–1)
BUN SERPL-MCNC: 65 MG/DL (ref 7–18)
CALCIUM SERPL-MCNC: 9.3 MG/DL (ref 8.5–10.1)
CHLORIDE SERPL-SCNC: 100 MMOL/L (ref 98–107)
CHOLEST SERPL-MCNC: 131 MG/DL (ref ?–200)
CO2 SERPL-SCNC: 22 MMOL/L (ref 21–32)
CREAT SERPL-MCNC: 2.9 MG/DL (ref 0.6–1.3)
EOSINOPHIL NFR BLD AUTO: 1 % (ref 0–6)
GLUCOSE SERPL-MCNC: 464 MG/DL (ref 74–106)
HCT VFR BLD AUTO: 33 % (ref 39–51)
HDLC SERPL-MCNC: 29 MG/DL (ref 40–60)
HGB BLD-MCNC: 11.5 G/DL (ref 13.5–17.5)
LDLC SERPL DIRECT ASSAY-MCNC: 56 MG/DL (ref 0–99)
LYMPHOCYTES NFR BLD AUTO: 1.5 /CMM (ref 0.8–4.8)
LYMPHOCYTES NFR BLD AUTO: 22.9 % (ref 20–44)
MCHC RBC AUTO-ENTMCNC: 34 G/DL (ref 31–36)
MCV RBC AUTO: 86 FL (ref 80–96)
MONOCYTES NFR BLD AUTO: 0.6 /CMM (ref 0.1–1.3)
MONOCYTES NFR BLD AUTO: 9.9 % (ref 2–12)
NEUTROPHILS # BLD AUTO: 4.2 /CMM (ref 1.8–8.9)
NEUTROPHILS NFR BLD AUTO: 65.4 % (ref 43–81)
PLATELET # BLD AUTO: 166 /CMM (ref 150–450)
POTASSIUM SERPL-SCNC: 5.2 MMOL/L (ref 3.5–5.1)
PROT SERPL-MCNC: 7.7 G/DL (ref 6.4–8.2)
RBC # BLD AUTO: 3.91 MIL/UL (ref 4.5–6)
SODIUM SERPL-SCNC: 135 MMOL/L (ref 136–145)
TRIGL SERPL-MCNC: 567 MG/DL (ref 30–150)
TSH SERPL DL<=0.005 MIU/L-ACNC: 3.07 UIU/ML (ref 0.36–3.74)
WBC NRBC COR # BLD AUTO: 6.4 K/UL (ref 4.3–11)

## 2019-03-25 PROCEDURE — 87186 SC STD MICRODIL/AGAR DIL: CPT

## 2019-03-25 PROCEDURE — 87070 CULTURE OTHR SPECIMN AEROBIC: CPT

## 2019-03-25 PROCEDURE — 11043 DBRDMT MUSC&/FSCA 1ST 20/<: CPT

## 2019-03-25 PROCEDURE — A6248 HYDROGEL DRSG GEL FILLER: HCPCS

## 2019-03-25 PROCEDURE — A6402 STERILE GAUZE <= 16 SQ IN: HCPCS

## 2019-03-25 RX ADMIN — INSULIN HUMAN PRN UNIT: 100 INJECTION, SOLUTION PARENTERAL at 18:03

## 2019-03-25 RX ADMIN — Medication SCH EACH: at 21:56

## 2019-03-25 RX ADMIN — Medication SCH EACH: at 16:23

## 2019-03-25 RX ADMIN — SODIUM CHLORIDE PRN MLS/HR: 9 INJECTION, SOLUTION INTRAVENOUS at 16:24

## 2019-03-25 RX ADMIN — INSULIN HUMAN PRN UNIT: 100 INJECTION, SOLUTION PARENTERAL at 22:08

## 2019-03-25 RX ADMIN — CARVEDILOL SCH MG: 6.25 TABLET, FILM COATED ORAL at 16:29

## 2019-03-25 RX ADMIN — DEXTROSE MONOHYDRATE SCH MLS/HR: 50 INJECTION, SOLUTION INTRAVENOUS at 20:32

## 2019-03-25 NOTE — NUR
RN NOTES

 MRSA OF SWAB NARES, PICTURE TAKEN. PATIENT STABLE  RESTING IN THE BED, CALL LIGHT WITHIN TO 
REACH. ENDORSED ONCOMING NURSE FOR PLAN OF CARE.

## 2019-03-25 NOTE — NUR
RN NOTES

BS-394 MG/DL COVERAGE GIVEN ALSO ADMINISTERED SCHEDULED MEDICATION. INFUSING  NS AT 75 ML/HE 
INTACT ON LEFT FS. PATIENT REFUSED PAIN AT THIS TIME. CONTINUED MONITORING.

## 2019-03-25 NOTE — NUR
RN ADMISSION NOTES

[facility]OT WOUND INFECTION. NWB 
on RIGHT FOOT. Unable to assess wound at this moment. Patient wants to keep his socks on. 
Noted LEFT 3RD FINGER and LEFT GREAT TOE amputated. A/O x 4 with no acute distress. Denies 
pain. VS taken. Temp-98.6 BP-168/74 HR-70 RR 18 O2 Sat-100% on 2LPM via NC. Patient states 
that he is anxious. Comfort measures provided. All needs rendered. Patient continent with 
urinal at bedside. Call light within reach. Will continue to monitor.

## 2019-03-26 VITALS — SYSTOLIC BLOOD PRESSURE: 133 MMHG | DIASTOLIC BLOOD PRESSURE: 62 MMHG

## 2019-03-26 VITALS — SYSTOLIC BLOOD PRESSURE: 127 MMHG | DIASTOLIC BLOOD PRESSURE: 61 MMHG

## 2019-03-26 VITALS — SYSTOLIC BLOOD PRESSURE: 133 MMHG | DIASTOLIC BLOOD PRESSURE: 61 MMHG

## 2019-03-26 LAB
BASOPHILS # BLD AUTO: 0.1 /CMM (ref 0–0.2)
BASOPHILS NFR BLD AUTO: 1.1 % (ref 0–2)
BUN SERPL-MCNC: 52 MG/DL (ref 7–18)
CALCIUM SERPL-MCNC: 9.1 MG/DL (ref 8.5–10.1)
CHLORIDE SERPL-SCNC: 105 MMOL/L (ref 98–107)
CO2 SERPL-SCNC: 21 MMOL/L (ref 21–32)
CREAT SERPL-MCNC: 2.3 MG/DL (ref 0.6–1.3)
EOSINOPHIL NFR BLD AUTO: 1.3 % (ref 0–6)
GLUCOSE SERPL-MCNC: 179 MG/DL (ref 74–106)
HCT VFR BLD AUTO: 32 % (ref 39–51)
HGB BLD-MCNC: 10.8 G/DL (ref 13.5–17.5)
LYMPHOCYTES NFR BLD AUTO: 2 /CMM (ref 0.8–4.8)
LYMPHOCYTES NFR BLD AUTO: 33 % (ref 20–44)
MAGNESIUM SERPL-MCNC: 2 MG/DL (ref 1.8–2.4)
MCHC RBC AUTO-ENTMCNC: 34 G/DL (ref 31–36)
MCV RBC AUTO: 84 FL (ref 80–96)
MONOCYTES NFR BLD AUTO: 0.9 /CMM (ref 0.1–1.3)
MONOCYTES NFR BLD AUTO: 14.8 % (ref 2–12)
NEUTROPHILS # BLD AUTO: 3 /CMM (ref 1.8–8.9)
NEUTROPHILS NFR BLD AUTO: 49.8 % (ref 43–81)
PHOSPHATE SERPL-MCNC: 4.1 MG/DL (ref 2.5–4.9)
PLATELET # BLD AUTO: 144 /CMM (ref 150–450)
POTASSIUM SERPL-SCNC: 4.5 MMOL/L (ref 3.5–5.1)
RBC # BLD AUTO: 3.8 MIL/UL (ref 4.5–6)
SODIUM SERPL-SCNC: 138 MMOL/L (ref 136–145)
WBC NRBC COR # BLD AUTO: 6 K/UL (ref 4.3–11)

## 2019-03-26 RX ADMIN — CLOPIDOGREL BISULFATE SCH MG: 75 TABLET, FILM COATED ORAL at 08:39

## 2019-03-26 RX ADMIN — INSULIN HUMAN PRN UNIT: 100 INJECTION, SOLUTION PARENTERAL at 06:36

## 2019-03-26 RX ADMIN — Medication SCH EACH: at 23:03

## 2019-03-26 RX ADMIN — Medication SCH EACH: at 06:30

## 2019-03-26 RX ADMIN — CARVEDILOL SCH MG: 6.25 TABLET, FILM COATED ORAL at 08:39

## 2019-03-26 RX ADMIN — SODIUM CHLORIDE PRN MLS/HR: 9 INJECTION, SOLUTION INTRAVENOUS at 06:36

## 2019-03-26 RX ADMIN — DEXTROSE MONOHYDRATE SCH MLS/HR: 50 INJECTION, SOLUTION INTRAVENOUS at 18:58

## 2019-03-26 RX ADMIN — ATORVASTATIN CALCIUM SCH MG: 40 TABLET, FILM COATED ORAL at 08:39

## 2019-03-26 RX ADMIN — LISINOPRIL SCH MG: 5 TABLET ORAL at 08:40

## 2019-03-26 RX ADMIN — CARVEDILOL SCH MG: 6.25 TABLET, FILM COATED ORAL at 16:07

## 2019-03-26 RX ADMIN — Medication SCH MG: at 09:37

## 2019-03-26 RX ADMIN — AMIODARONE HYDROCHLORIDE SCH MG: 200 TABLET ORAL at 08:40

## 2019-03-26 RX ADMIN — INSULIN HUMAN PRN UNIT: 100 INJECTION, SOLUTION PARENTERAL at 11:52

## 2019-03-26 RX ADMIN — SODIUM CHLORIDE PRN MLS/HR: 9 INJECTION, SOLUTION INTRAVENOUS at 23:04

## 2019-03-26 RX ADMIN — DEXTROSE MONOHYDRATE SCH MLS/HR: 50 INJECTION, SOLUTION INTRAVENOUS at 20:27

## 2019-03-26 RX ADMIN — Medication SCH EACH: at 16:57

## 2019-03-26 RX ADMIN — Medication SCH EACH: at 11:49

## 2019-03-26 RX ADMIN — INSULIN HUMAN PRN UNIT: 100 INJECTION, SOLUTION PARENTERAL at 23:04

## 2019-03-26 RX ADMIN — LINAGLIPTIN SCH MG: 5 TABLET, FILM COATED ORAL at 08:39

## 2019-03-26 RX ADMIN — INSULIN HUMAN PRN UNIT: 100 INJECTION, SOLUTION PARENTERAL at 17:01

## 2019-03-26 NOTE — NUR
MS/RN NOTES



PT ROUNDING PERFORMED. PT ASLEEP, BREATHING EVEN AND UNLABORED. IN NO ACUTE DISTRESS. WILL 
CONTINUE TO MONITOR

## 2019-03-26 NOTE — NUR
M/S RN CLOSING NOTES



PATIENT A/O X 4 AND ABLE TO MAKE NEEDS KNOWN. ON BED WITH HOB ELEVATED AND COMFORTABLE. 
RESPIRATION EVEN AND NON LABORED WITH NO ACUTE RESPIRATORY DISTRESS. SKIN WARM TO TOUCH AND 
DRY, RIGHT FOOT ULCER PRESENT WITH INTACT DRESSING. ABDOMEN SOFT AND NON DISTENDED WITH 
ACTIVE BOWEL SOUNDS. DENIES PAIN AND DISCOMFORT DURING THE SHIFT. IV LINE PATENT WITH 
FLUSHING ON LEFT FOREARM WITH GAUGE 20 RUNNING NS AT 75 ML/HR. ALL CONCERNS ADDRESSED. WILL 
ENDORSED PATIENT CONDITION TO NEXT SHIFT.

## 2019-03-26 NOTE — NUR
MS/RN CLOSING NOTES



PT ASLEEP, OPENS EYES TO NAME. REMAINS ON ROOM AIR, BREATHING EVEN AND UNLABORED. DENIES SOB 
AND PAIN. DRESSING TO RIGHT FOOT REMAINS C/D/I. IV TO LFA PATENT AND INTACT RUNNING IVF AS 
ORDERED. ACCUCHECKS AND INSULIN SLIDING SCALE COVERAGE PROVIDED. NO SIGNIFICANT CHANGES 
OVERNIGHT. BED REMAINS IN LOW/LOCKED POSITION WITH CALL LIGHT IN REACH. HOB ELEVATED AND 
BILATERAL UPPER SIDE RAILS IN PLACE. SLEPT WELL DURING SHIFT. WILL ENDORSE TO DAY SHIFT RN 
NIGEL.

## 2019-03-26 NOTE — NUR
MS/RN NOTES



RECEIVED PT. LYING IN BED. PT. IS AWAKE, ALERT AND ORIENTED X4. BREATHING EVEN AND UNLABORED 
ON ROOM AIR. NO SOB, RESPIRATORY DISTRESS OR COMPLAINTS OF PAIN NOTED AT THIS TIME. PT. WITH 
LEFT FOREARM 20 GAUGE PERIPHERAL IV PRESENT, PATENT AND INTACT ADMINISTERING TO PT. NS @ 75 
ML/HR. BED LOCKED AND IN LOWEST POSITION, SIDE RAILS UP X2, CALL LIGHT WITHIN REACH, WILL 
CONTINUE TO MONITOR.

## 2019-03-26 NOTE — NUR
WOUND RN OPENING NOTES:



PATIENT A/O X 4 ADMITTED FOR RIGHT FOOT ULCER INFECTION. RESPIRATION EVEN AND UNLABORED WITH 
NO ACUTE RESPIRATORY DISTRESS. ABDOMEN SOFT AND NON DISTENDED WITH ACTIVE BOWEL SOUNDS. SKIN 
WARM TO TOUCH, WITH RIGHT FOOT ON DRESSING FOR ULCER. DENIES PAIN AND DISCOMFORT AT THIS 
TIME. IV LINE PATENT WITH NO S/SX OF INFILTRATION, RUNNING ON NS AT 75 ML/HR. ALL CONCERNS 
ADDRESSED. PLACED CALL LIGHT WITHIN REACH TO ENSURE SAFETY. WILL CONTINUE TO EVALUATE CARE.

## 2019-03-27 VITALS — SYSTOLIC BLOOD PRESSURE: 125 MMHG | DIASTOLIC BLOOD PRESSURE: 60 MMHG

## 2019-03-27 VITALS — SYSTOLIC BLOOD PRESSURE: 135 MMHG | DIASTOLIC BLOOD PRESSURE: 62 MMHG

## 2019-03-27 VITALS — DIASTOLIC BLOOD PRESSURE: 63 MMHG | SYSTOLIC BLOOD PRESSURE: 134 MMHG

## 2019-03-27 LAB
BUN SERPL-MCNC: 39 MG/DL (ref 7–18)
CALCIUM SERPL-MCNC: 9 MG/DL (ref 8.5–10.1)
CHLORIDE SERPL-SCNC: 105 MMOL/L (ref 98–107)
CO2 SERPL-SCNC: 22 MMOL/L (ref 21–32)
CREAT SERPL-MCNC: 1.9 MG/DL (ref 0.6–1.3)
GLUCOSE SERPL-MCNC: 150 MG/DL (ref 74–106)
POTASSIUM SERPL-SCNC: 4.5 MMOL/L (ref 3.5–5.1)
SODIUM SERPL-SCNC: 137 MMOL/L (ref 136–145)

## 2019-03-27 RX ADMIN — DEXTROSE MONOHYDRATE SCH MLS/HR: 50 INJECTION, SOLUTION INTRAVENOUS at 19:42

## 2019-03-27 RX ADMIN — ATORVASTATIN CALCIUM SCH MG: 40 TABLET, FILM COATED ORAL at 09:17

## 2019-03-27 RX ADMIN — Medication SCH GM: at 10:42

## 2019-03-27 RX ADMIN — DEXTROSE MONOHYDRATE SCH MLS/HR: 50 INJECTION, SOLUTION INTRAVENOUS at 20:27

## 2019-03-27 RX ADMIN — INSULIN HUMAN PRN UNIT: 100 INJECTION, SOLUTION PARENTERAL at 21:16

## 2019-03-27 RX ADMIN — Medication SCH EACH: at 06:40

## 2019-03-27 RX ADMIN — Medication SCH MG: at 09:18

## 2019-03-27 RX ADMIN — CARVEDILOL SCH MG: 6.25 TABLET, FILM COATED ORAL at 17:11

## 2019-03-27 RX ADMIN — CLOPIDOGREL BISULFATE SCH MG: 75 TABLET, FILM COATED ORAL at 09:17

## 2019-03-27 RX ADMIN — LINAGLIPTIN SCH MG: 5 TABLET, FILM COATED ORAL at 09:17

## 2019-03-27 RX ADMIN — AMIODARONE HYDROCHLORIDE SCH MG: 200 TABLET ORAL at 09:18

## 2019-03-27 RX ADMIN — INSULIN HUMAN PRN UNIT: 100 INJECTION, SOLUTION PARENTERAL at 12:21

## 2019-03-27 RX ADMIN — LISINOPRIL SCH MG: 5 TABLET ORAL at 09:18

## 2019-03-27 RX ADMIN — Medication SCH EACH: at 12:19

## 2019-03-27 RX ADMIN — CARVEDILOL SCH MG: 6.25 TABLET, FILM COATED ORAL at 09:17

## 2019-03-27 RX ADMIN — Medication SCH EACH: at 17:38

## 2019-03-27 RX ADMIN — INSULIN HUMAN PRN UNIT: 100 INJECTION, SOLUTION PARENTERAL at 17:36

## 2019-03-27 RX ADMIN — Medication SCH EACH: at 21:12

## 2019-03-27 RX ADMIN — INSULIN HUMAN PRN UNIT: 100 INJECTION, SOLUTION PARENTERAL at 06:43

## 2019-03-27 NOTE — NUR
RECEIVED PATIENT IN  BED AWAKE. AO X 3, ABLE TO MAKE NEED KNOWN. NO ACUTE DISTRESS NOTED. 
DENIES ANY PAIN AT THIS TIME. IV SITE PATENT, INTACT; IVF INFUSING AS ORDERED. RIGHT FOOT 
DRESSING INTACT. SAFETY REMINDERS GIVEN. ON LOW BED WITH BILATERAL UPPER SIDE RAILS UP. CALL 
BELL WITHIN EASY REACH. WILL CONTINUE TO MONITOR.

## 2019-03-27 NOTE — NUR
MS RN OPENING NOTES



RECEIVED PT LAYING IN BED WITH HOB SLIGHTLY ELEVATED. PT IS A/O X3-4, AFEBRILE. RESPIRATIONS 
ARE EVEN AND UNLABORED, NOT IN ANY ACUTE DISTRESS NOTED. PT DENIES ANY PAIN AT THIS TIME, NO 
C/O SOB, N/V. IV SITE LFA INTACT, NO INFILTRATION NOTED. DRESSING KEPT CLEAN AND DRY. IV 
FLUIDS RUNNING AT 75ML/HR, TOLERATING WELL. SAFETY MEASURES ARE IN PLACE. WILL MONITOR 
THROUGHOUT SHIFT FOR CONTINUITY OF CARE.

## 2019-03-27 NOTE — NUR
MS RN CLOSING NOTES



ALL DUE MEDS GIVEN, NEEDS MET AND RENDERED. PT IS A/O X3-4, AFEBRILE. RESPIRATIONS ARE EVEN 
AND UNLABORED, NOT IN ANY ACUTE DISTRESS NOTED. PT DENIES ANY PAIN AT THIS TIME, NO C/O SOB, 
N/V. IV SITE LFA INTACT, NO INFILTRATION NOTED. DRESSING KEPT CLEAN AND DRY. IV FLUIDS 
RUNNING AT 75ML/HR, TOLERATING WELL. SAFETY MEASURES ARE IN PLACE. WILL ENDORSE TO NEXT 
SHIFT FOR CONTINUITY OF CARE.

## 2019-03-27 NOTE — NUR
MS/RN NOTES



PT. IS LYING IN BED RESTING. BREATHING EVEN AND UNLABORED ON ROOM AIR. NO SOB, RESPIRATORY 
DISTRESS OR COMPLAINTS OF PAIN NOTED AT THIS TIME. PT. WITH LEFT FOREARM 20 GAUGE PERIPHERAL 
IV PRESENT, PATENT AND INTACT ADMINISTERING TO PT. NS @ 75 ML/HR. ALL PT. NEEDS MET. NO S/S 
OF HYPO/HYPERGLYCEMIA NOTED AT THIS TIME AND THROUGHOUT SHIFT. BED LOCKED AND IN LOWEST 
POSITION, SIDE RAILS UP X2, CALL LIGHT WITHIN REACH, WILL ENDORSE TO DAYSHIFT NURSE FOR 
CONTINUITY OF CARE.

## 2019-03-27 NOTE — NUR
MS RN NOTES-- PT SEEN BY DR. GRANGER, DRESSING WAS CHANGED TO RIGHT FOOD AS ORDERED. PT 
TOLERATED DRESSING CHANGE WELL.

## 2019-03-28 VITALS — DIASTOLIC BLOOD PRESSURE: 60 MMHG | SYSTOLIC BLOOD PRESSURE: 123 MMHG

## 2019-03-28 VITALS — SYSTOLIC BLOOD PRESSURE: 123 MMHG | DIASTOLIC BLOOD PRESSURE: 60 MMHG

## 2019-03-28 LAB
BUN SERPL-MCNC: 29 MG/DL (ref 7–18)
CALCIUM SERPL-MCNC: 9 MG/DL (ref 8.5–10.1)
CHLORIDE SERPL-SCNC: 104 MMOL/L (ref 98–107)
CO2 SERPL-SCNC: 23 MMOL/L (ref 21–32)
CREAT SERPL-MCNC: 1.9 MG/DL (ref 0.6–1.3)
GLUCOSE SERPL-MCNC: 191 MG/DL (ref 74–106)
POTASSIUM SERPL-SCNC: 4.7 MMOL/L (ref 3.5–5.1)
SODIUM SERPL-SCNC: 136 MMOL/L (ref 136–145)

## 2019-03-28 RX ADMIN — CARVEDILOL SCH MG: 6.25 TABLET, FILM COATED ORAL at 08:55

## 2019-03-28 RX ADMIN — ATORVASTATIN CALCIUM SCH MG: 40 TABLET, FILM COATED ORAL at 08:56

## 2019-03-28 RX ADMIN — LINAGLIPTIN SCH MG: 5 TABLET, FILM COATED ORAL at 08:55

## 2019-03-28 RX ADMIN — INSULIN HUMAN PRN UNIT: 100 INJECTION, SOLUTION PARENTERAL at 12:11

## 2019-03-28 RX ADMIN — Medication SCH MG: at 08:55

## 2019-03-28 RX ADMIN — Medication SCH GM: at 08:58

## 2019-03-28 RX ADMIN — CLOPIDOGREL BISULFATE SCH MG: 75 TABLET, FILM COATED ORAL at 08:56

## 2019-03-28 RX ADMIN — AMIODARONE HYDROCHLORIDE SCH MG: 200 TABLET ORAL at 08:56

## 2019-03-28 RX ADMIN — Medication SCH EACH: at 12:09

## 2019-03-28 RX ADMIN — Medication SCH EACH: at 06:39

## 2019-03-28 RX ADMIN — SODIUM CHLORIDE PRN MLS/HR: 9 INJECTION, SOLUTION INTRAVENOUS at 10:10

## 2019-03-28 RX ADMIN — LISINOPRIL SCH MG: 5 TABLET ORAL at 08:55

## 2019-03-28 NOTE — NUR
RN OPENING NOTES



RECEIVED PT IN BED, AWAKE. A/O X4. TOLERATING RA, WITH NO SOB NOTED. DENIES PAIN.  IVF OF NS 
AT 75ML/HR TO LFA G20, INTACT AND PATENT. PT DENIES ANY CONCERNS OR QUESTIONS AT THE MOMENT. 
PT KEPT COMFORTABLE. BED IN LOWEST, LOCKED POSITION WITH SR X2. CALL LIGHT KEPT WITHIN 
REACH. WILL CONTINUE PLAN OF CARE.

## 2019-03-28 NOTE — NUR
PATIENT ASLEEP, EASILY AROUSABLE. RESPIRATIONS EVEN. NO SIGNS OF PAIN NOTED. NO SYMPTOMS OF 
HYPER/HYPOGLYCEMIA. DUE MEDS GIVEN WITH NO ASE NOTED. IVF INFUSING AS ORDERED. NEEDS 
ATTENDED. SAFETY PRECAUTIONS AND COMFORT MEASURES IN PLACE. WILL GIVE REPORT TO DAY SHIFT 
FOR CONTINUITY OF CARE.

## 2019-03-28 NOTE — NUR
RN DISCHARGE NOTES



PT DISCHARGED TO [facility]. DISCHARGE INTRUCTIONS REVIEWED WITH THE PT AND SIGNED. 
ALL BELONGINGS WITH THE PT. REMOVED LFA PIV, DRY DRESSING APPLIED. ALL NEEDS AND CARE 
PROVIDED. REPORT GIVEN TO SNF NURSE, NEED TO CALL BACK. PT ASSISTED BY 2 EMTS, TRANSPORTED 
VIA GURNEY. PT DENIES PAIN OR ANY DISCOMFORT. LEFT THE FACILITY AT 1655. CN AND MD AWARE.

## 2019-04-02 NOTE — NUR
LATE ENTRY FOR 3/28/19 4611 (ADDED TO DISCHARGE NOTE)



PT OFFERED FOR PICTURES TO BE TAKEN FOR HIS WOUND ON THE FOOT PER POLICY, PT REFUSED TO HAVE 
PICTURE TAKEN AND STATED HE IS EXHAUSTED AND THINKING A LOT OF THINGS AT THAT TIME. 
RESPECTED PT'S RIGHTS. BUT PT LET RN TO CHANGE THE DRESSING ON HIS WOUND/ULCER.

## 2019-12-21 NOTE — NUR
MS/RN OPENING NOTES



PT ASLEEP, OPENS EYES TO NAME. ON ROOM AIR, BREATHING EVEN AND UNLABORED. DENIES SOB AND 
PAIN. DRESSING TO RIGHT FOOT C/D/I. IV TO LFA PATENT AND INTACT RUNNING IVF AS ORDERED. NO 
NEEDS EXPRESSED AT THIS TIME. BED IN LOW/LOCKED POSITION WITH CALL LIGHT IN REACH. HOB 
ELEVATED AND BILATERAL UPPER SIDE RAILS IN PLACE. WILL CONTINUE TO MONITOR Progress Note - David Price 41 y o  male MRN: [id number]    Unit/Bed#: CW2 218-01 Encounter: [id number]      CC: diabetes f/u    Subjective:   David Price is a 41y o  year old male with type 2 diabetes  Feels well  No complaints  No hypoglycemia  Objective:     Vitals: Blood pressure 163/95, pulse 88, temperature 97 7 °F (36 5 °C), temperature source Oral, resp  rate 20, height 5' 10" (1 778 m), weight 76 7 kg (169 lb 3 2 oz), SpO2 96 %  ,Body mass index is 24 28 kg/m²  Intake/Output Summary (Last 24 hours) at 12/21/2019 1252  Last data filed at 12/21/2019 0755  Gross per 24 hour   Intake 360 ml   Output 700 ml   Net -340 ml       Physical Exam:  General Appearance: awake, appears stated age and cooperative  Head: Normocephalic, without obvious abnormality, atraumatic  Extremities: moves all extremities  Skin: Skin color and temperature normal    Pulm: no labored breathing    Lab, Imaging and other studies: I have personally reviewed pertinent reports  POC Glucose   Date Value   12/21/2019 302 mg/dl (H)   12/21/2019 230 mg/dl (H)   12/20/2019 268 mg/dl (H)   12/20/2019 225 mg/dl (H)   12/20/2019 236 mg/dl (H)   12/20/2019 136 mg/dl   12/19/2019 275 mg/dl (H)   12/19/2019 187 mg/dl (H)   12/19/2019 341 mg/dl (H)   12/19/2019 146 mg/dl (H)   04/10/2015 108 mg/dL       Assessment:  diabetes with hyperglycemia    Plan:  1  Type 2 diabetes with hyperglycemia - blood sugar this morning 230  I will increase Lantus to 40 units in the evening and continue Humalog 12 units with meals  I will continue to monitor his blood sugars and make changes to his insulin regimen as needed  Patient should be discharged on jardiance 25 mg to decrease his cardiovascular mortality  2  Coronary artery disease - status post stent placement for non ST elevation MI          Portions of the record may have been created with voice recognition software

## 2021-03-01 ENCOUNTER — HOSPITAL ENCOUNTER (OUTPATIENT)
Dept: HOSPITAL 54 - WOU | Age: 71
Discharge: HOME | End: 2021-03-01
Attending: STUDENT IN AN ORGANIZED HEALTH CARE EDUCATION/TRAINING PROGRAM
Payer: MEDICARE

## 2021-03-01 DIAGNOSIS — E11.40: ICD-10-CM

## 2021-03-01 DIAGNOSIS — Z89.422: ICD-10-CM

## 2021-03-01 DIAGNOSIS — E66.09: ICD-10-CM

## 2021-03-01 DIAGNOSIS — E11.621: Primary | ICD-10-CM

## 2021-03-01 DIAGNOSIS — Z79.84: ICD-10-CM

## 2021-03-01 DIAGNOSIS — Z89.412: ICD-10-CM

## 2021-03-01 DIAGNOSIS — L97.512: ICD-10-CM

## 2021-03-11 ENCOUNTER — HOSPITAL ENCOUNTER (OUTPATIENT)
Dept: HOSPITAL 54 - WOU | Age: 71
Discharge: HOME | End: 2021-03-11
Attending: STUDENT IN AN ORGANIZED HEALTH CARE EDUCATION/TRAINING PROGRAM
Payer: MEDICARE

## 2021-03-11 DIAGNOSIS — I25.10: ICD-10-CM

## 2021-03-11 DIAGNOSIS — I10: ICD-10-CM

## 2021-03-11 DIAGNOSIS — Z89.412: ICD-10-CM

## 2021-03-11 DIAGNOSIS — E11.40: ICD-10-CM

## 2021-03-11 DIAGNOSIS — L97.512: ICD-10-CM

## 2021-03-11 DIAGNOSIS — Z79.84: ICD-10-CM

## 2021-03-11 DIAGNOSIS — E11.621: Primary | ICD-10-CM

## 2021-03-11 DIAGNOSIS — E66.09: ICD-10-CM

## 2021-03-25 ENCOUNTER — HOSPITAL ENCOUNTER (OUTPATIENT)
Dept: HOSPITAL 54 - WOU | Age: 71
Discharge: HOME | End: 2021-03-25
Attending: STUDENT IN AN ORGANIZED HEALTH CARE EDUCATION/TRAINING PROGRAM
Payer: MEDICARE

## 2021-03-25 DIAGNOSIS — Z89.412: ICD-10-CM

## 2021-03-25 DIAGNOSIS — E66.09: ICD-10-CM

## 2021-03-25 DIAGNOSIS — Z79.84: ICD-10-CM

## 2021-03-25 DIAGNOSIS — E11.40: ICD-10-CM

## 2021-03-25 DIAGNOSIS — I10: ICD-10-CM

## 2021-03-25 DIAGNOSIS — E11.621: Primary | ICD-10-CM

## 2021-03-25 DIAGNOSIS — L97.512: ICD-10-CM

## 2021-08-26 ENCOUNTER — HOSPITAL ENCOUNTER (OUTPATIENT)
Dept: HOSPITAL 54 - WOU | Age: 71
Discharge: HOME | End: 2021-08-26
Attending: STUDENT IN AN ORGANIZED HEALTH CARE EDUCATION/TRAINING PROGRAM
Payer: MEDICARE

## 2021-08-26 DIAGNOSIS — F17.200: ICD-10-CM

## 2021-08-26 DIAGNOSIS — E11.621: Primary | ICD-10-CM

## 2021-08-26 DIAGNOSIS — L97.512: ICD-10-CM

## 2021-08-26 DIAGNOSIS — E66.9: ICD-10-CM

## 2021-08-26 DIAGNOSIS — Z98.61: ICD-10-CM

## 2021-08-26 DIAGNOSIS — Z79.84: ICD-10-CM

## 2021-08-26 DIAGNOSIS — I25.10: ICD-10-CM

## 2021-08-26 DIAGNOSIS — I48.91: ICD-10-CM

## 2021-08-26 DIAGNOSIS — Z89.412: ICD-10-CM

## 2021-08-26 DIAGNOSIS — E11.40: ICD-10-CM

## 2021-08-26 DIAGNOSIS — E78.5: ICD-10-CM

## 2021-08-26 DIAGNOSIS — Z89.022: ICD-10-CM

## 2021-08-26 DIAGNOSIS — I10: ICD-10-CM

## 2021-08-26 LAB
ALBUMIN SERPL BCP-MCNC: 3.8 G/DL (ref 3.4–5)
ALP SERPL-CCNC: 72 U/L (ref 46–116)
ALT SERPL W P-5'-P-CCNC: 49 U/L (ref 12–78)
AST SERPL W P-5'-P-CCNC: 23 U/L (ref 15–37)
BASOPHILS # BLD AUTO: 0.1 K/UL (ref 0–0.2)
BASOPHILS NFR BLD AUTO: 0.8 % (ref 0–2)
BILIRUB SERPL-MCNC: 0.6 MG/DL (ref 0.2–1)
BUN SERPL-MCNC: 40 MG/DL (ref 7–18)
CALCIUM SERPL-MCNC: 9.6 MG/DL (ref 8.5–10.1)
CHLORIDE SERPL-SCNC: 100 MMOL/L (ref 98–107)
CO2 SERPL-SCNC: 23 MMOL/L (ref 21–32)
CREAT SERPL-MCNC: 2.1 MG/DL (ref 0.6–1.3)
CRP SERPL-MCNC: 1.5 MG/DL (ref 0–0.9)
EOSINOPHIL NFR BLD AUTO: 1.7 % (ref 0–6)
GLUCOSE SERPL-MCNC: 252 MG/DL (ref 74–106)
HCT VFR BLD AUTO: 40 % (ref 39–51)
HGB BLD-MCNC: 13.6 G/DL (ref 13.5–17.5)
LYMPHOCYTES NFR BLD AUTO: 2.6 K/UL (ref 0.8–4.8)
LYMPHOCYTES NFR BLD AUTO: 34.2 % (ref 20–44)
MCHC RBC AUTO-ENTMCNC: 34 G/DL (ref 31–36)
MCV RBC AUTO: 84 FL (ref 80–96)
MONOCYTES NFR BLD AUTO: 0.7 K/UL (ref 0.1–1.3)
MONOCYTES NFR BLD AUTO: 9.4 % (ref 2–12)
NEUTROPHILS # BLD AUTO: 4 K/UL (ref 1.8–8.9)
NEUTROPHILS NFR BLD AUTO: 53.9 % (ref 43–81)
PLATELET # BLD AUTO: 213 K/UL (ref 150–450)
POTASSIUM SERPL-SCNC: 4.4 MMOL/L (ref 3.5–5.1)
PREALB SERPL-MCNC: 21 MG/DL (ref 18–35.7)
PROT SERPL-MCNC: 8.3 G/DL (ref 6.4–8.2)
RBC # BLD AUTO: 4.8 MIL/UL (ref 4.5–6)
SODIUM SERPL-SCNC: 136 MMOL/L (ref 136–145)
WBC NRBC COR # BLD AUTO: 7.5 K/UL (ref 4.3–11)

## 2021-09-02 ENCOUNTER — HOSPITAL ENCOUNTER (OUTPATIENT)
Dept: HOSPITAL 54 - WOU | Age: 71
Discharge: HOME | End: 2021-09-02
Attending: STUDENT IN AN ORGANIZED HEALTH CARE EDUCATION/TRAINING PROGRAM
Payer: MEDICARE

## 2021-09-02 DIAGNOSIS — E78.5: ICD-10-CM

## 2021-09-02 DIAGNOSIS — Z79.84: ICD-10-CM

## 2021-09-02 DIAGNOSIS — Z89.412: ICD-10-CM

## 2021-09-02 DIAGNOSIS — L97.512: ICD-10-CM

## 2021-09-02 DIAGNOSIS — E11.40: ICD-10-CM

## 2021-09-02 DIAGNOSIS — I25.10: ICD-10-CM

## 2021-09-02 DIAGNOSIS — E66.09: ICD-10-CM

## 2021-09-02 DIAGNOSIS — I10: ICD-10-CM

## 2021-09-02 DIAGNOSIS — E11.621: Primary | ICD-10-CM

## 2021-09-16 ENCOUNTER — HOSPITAL ENCOUNTER (OUTPATIENT)
Dept: HOSPITAL 54 - WOU | Age: 71
Discharge: HOME HEALTH | End: 2021-09-16
Attending: STUDENT IN AN ORGANIZED HEALTH CARE EDUCATION/TRAINING PROGRAM
Payer: MEDICARE

## 2021-09-16 DIAGNOSIS — I10: ICD-10-CM

## 2021-09-16 DIAGNOSIS — E11.40: ICD-10-CM

## 2021-09-16 DIAGNOSIS — E66.09: ICD-10-CM

## 2021-09-16 DIAGNOSIS — Z89.412: ICD-10-CM

## 2021-09-16 DIAGNOSIS — Z79.84: ICD-10-CM

## 2021-09-16 DIAGNOSIS — L97.512: ICD-10-CM

## 2021-09-16 DIAGNOSIS — E11.621: Primary | ICD-10-CM

## 2021-09-16 DIAGNOSIS — I25.10: ICD-10-CM
